# Patient Record
Sex: MALE | Race: WHITE | NOT HISPANIC OR LATINO | Employment: FULL TIME | URBAN - METROPOLITAN AREA
[De-identification: names, ages, dates, MRNs, and addresses within clinical notes are randomized per-mention and may not be internally consistent; named-entity substitution may affect disease eponyms.]

---

## 2021-01-18 ENCOUNTER — IMMUNIZATION (OUTPATIENT)
Dept: VACCINE CLINIC | Facility: HOSPITAL | Age: 36
End: 2021-01-18

## 2021-01-18 PROCEDURE — 0001A: CPT | Performed by: INTERNAL MEDICINE

## 2021-01-18 PROCEDURE — 91300 HC SARSCOV02 VAC 30MCG/0.3ML IM: CPT | Performed by: INTERNAL MEDICINE

## 2021-02-08 ENCOUNTER — IMMUNIZATION (OUTPATIENT)
Dept: VACCINE CLINIC | Facility: HOSPITAL | Age: 36
End: 2021-02-08

## 2021-02-08 PROCEDURE — 0002A: CPT | Performed by: INTERNAL MEDICINE

## 2021-02-08 PROCEDURE — 91300 HC SARSCOV02 VAC 30MCG/0.3ML IM: CPT | Performed by: INTERNAL MEDICINE

## 2024-04-24 ENCOUNTER — OFFICE VISIT (OUTPATIENT)
Dept: SLEEP MEDICINE | Age: 39
End: 2024-04-24
Payer: COMMERCIAL

## 2024-04-24 VITALS
DIASTOLIC BLOOD PRESSURE: 80 MMHG | TEMPERATURE: 98 F | OXYGEN SATURATION: 98 % | SYSTOLIC BLOOD PRESSURE: 134 MMHG | HEIGHT: 67 IN | HEART RATE: 80 BPM | WEIGHT: 225 LBS | BODY MASS INDEX: 35.31 KG/M2

## 2024-04-24 DIAGNOSIS — G47.33 OBSTRUCTIVE SLEEP APNEA, ADULT: ICD-10-CM

## 2024-04-24 DIAGNOSIS — G47.19 EXCESSIVE DAYTIME SLEEPINESS: ICD-10-CM

## 2024-04-24 DIAGNOSIS — R06.83 SNORING: ICD-10-CM

## 2024-04-24 DIAGNOSIS — E66.9 OBESITY (BMI 30-39.9): Primary | ICD-10-CM

## 2024-04-24 PROCEDURE — 99203 OFFICE O/P NEW LOW 30 MIN: CPT | Performed by: INTERNAL MEDICINE

## 2024-04-24 RX ORDER — ATORVASTATIN CALCIUM 40 MG/1
40 TABLET, FILM COATED ORAL DAILY
COMMUNITY

## 2024-04-24 NOTE — PROGRESS NOTES
"  Ulisses Potts is a 38 y.o. male.   Chief Complaint   Patient presents with    Snoring     SLEEP DISTURBANCE       HPI     38 y.o. male seen for the above    He describes longstanding snoring and nonrestorative sleep.  This started in a fairly minor way but over the last 3 or 4 years has progressed.  He is feeling more fatigued during the day.  His wife is complaining of snoring as well as apneas that are more prominent.    He wakes with a dry mouth and sore throat.  He has nocturnal reflux.  He has about 2 awakenings on average on a nightly basis.  He gets in bed around midnight and awakens between 7 and 9 AM.  He thinks he averages 6 or 7 hours of sleep per night.    He denies any RLS type symptoms.  He has not gained significant pastor of weight in the last 2 years.    [place] Scale is: 2/24    The patient's relevant past medical, surgical, family, and social history reviewed and updated in Epic as appropriate.    Current medications are:   Current Outpatient Medications:     atorvastatin (LIPITOR) 40 MG tablet, Take 1 tablet by mouth Daily., Disp: , Rfl: .    Review of Systems    Review of Systems  ROS documented in patient questionnaire ×14 systems.  Reviewed with patient.  Otherwise negative except as noted in HPI.    Physical Exam    Blood pressure 134/80, pulse 80, temperature 98 °F (36.7 °C), height 170.2 cm (67\"), weight 102 kg (225 lb), SpO2 98%. Body mass index is 35.24 kg/m².    Physical Exam  Vitals and nursing note reviewed.   Constitutional:       Appearance: Normal appearance. He is well-developed.   HENT:      Head: Normocephalic and atraumatic.      Nose: Nose normal.      Mouth/Throat:      Mouth: Mucous membranes are moist.      Pharynx: Oropharynx is clear. No oropharyngeal exudate.      Comments: Class airway IV   Eyes:      General: No scleral icterus.     Conjunctiva/sclera: Conjunctivae normal.   Neck:      Thyroid: No thyromegaly.      Trachea: No tracheal deviation.   Cardiovascular: "      Rate and Rhythm: Normal rate and regular rhythm.      Heart sounds: No murmur heard.     No friction rub. No gallop.   Pulmonary:      Effort: Pulmonary effort is normal. No respiratory distress.      Breath sounds: No wheezing or rales.   Musculoskeletal:         General: No deformity. Normal range of motion.   Skin:     General: Skin is warm and dry.      Findings: No rash.   Neurological:      Mental Status: He is alert and oriented to person, place, and time.   Psychiatric:         Behavior: Behavior normal.         Thought Content: Thought content normal.         DATA:    Reviewed bed partner questionnaire    ASSESSMENT:    Problem List Items Addressed This Visit       Obesity (BMI 30-39.9) - Primary     Other Visit Diagnoses       Obstructive sleep apnea, adult        Relevant Orders    Home Sleep Study    Snoring        Relevant Orders    Home Sleep Study    Excessive daytime sleepiness        Relevant Orders    Home Sleep Study            38-year-old male who presents to the sleep clinic with progressive snoring, witnessed apneas, and fatigue.  He has a class IV airway and elevated BMI.  He is at increased risk for the presence of sleep apnea and based upon his presentation I would recommend further evaluation for the likely presence of NOLBERTO.  I went over the likely diagnosis as well as the diagnostic process and treatment options in detail and answered all questions.  He agrees to proceed as noted below    PLAN:    - Home sleep apnea testing.  - Diagnostic process and potential treatment options discussed and questions/concerns addressed.  - Long-term cardiovascular and metabolic risks of untreated obstructive sleep apnea reviewed with the patient.  - The importance of long-term healthy weight loss discussed.  - Patient was agreeable to a trial of CPAP therapy on an initial trial basis if recommended after testing complete.  - Sleep center follow-up.    I have reviewed the results of my evaluation and  impression and discussed my recommendations in detail with the patient.    Signed by  Rahul Perez MD    April 24, 2024      CC: Maicol Hernandez MD Breeding, Van S, MD

## 2024-05-28 ENCOUNTER — HOSPITAL ENCOUNTER (OUTPATIENT)
Dept: SLEEP MEDICINE | Facility: HOSPITAL | Age: 39
Discharge: HOME | End: 2024-05-28
Admitting: INTERNAL MEDICINE
Payer: COMMERCIAL

## 2024-05-28 VITALS — WEIGHT: 225 LBS | BODY MASS INDEX: 35.31 KG/M2 | HEIGHT: 67 IN

## 2024-05-28 DIAGNOSIS — G47.19 EXCESSIVE DAYTIME SLEEPINESS: ICD-10-CM

## 2024-05-28 DIAGNOSIS — R06.83 SNORING: ICD-10-CM

## 2024-05-28 DIAGNOSIS — G47.33 OBSTRUCTIVE SLEEP APNEA, ADULT: ICD-10-CM

## 2024-05-28 PROCEDURE — 95800 SLP STDY UNATTENDED: CPT

## 2024-05-29 DIAGNOSIS — G47.33 OSA (OBSTRUCTIVE SLEEP APNEA): Primary | ICD-10-CM

## 2024-05-29 PROCEDURE — 95800 SLP STDY UNATTENDED: CPT | Performed by: INTERNAL MEDICINE

## 2024-05-31 ENCOUNTER — TELEPHONE (OUTPATIENT)
Dept: SLEEP MEDICINE | Age: 39
End: 2024-05-31
Payer: COMMERCIAL

## 2024-05-31 NOTE — TELEPHONE ENCOUNTER
Patient returned call he's ready to start CPAP therapy prefer DME in [facility]. Any questions call pt if needed at [phone]

## 2024-09-20 ENCOUNTER — OFFICE VISIT (OUTPATIENT)
Dept: SLEEP MEDICINE | Age: 39
End: 2024-09-20
Payer: COMMERCIAL

## 2024-09-20 VITALS
HEIGHT: 67 IN | DIASTOLIC BLOOD PRESSURE: 68 MMHG | TEMPERATURE: 97.3 F | WEIGHT: 235.5 LBS | HEART RATE: 86 BPM | BODY MASS INDEX: 36.96 KG/M2 | SYSTOLIC BLOOD PRESSURE: 122 MMHG | OXYGEN SATURATION: 95 %

## 2024-09-20 DIAGNOSIS — G47.33 OSA (OBSTRUCTIVE SLEEP APNEA): Primary | ICD-10-CM

## 2024-09-20 DIAGNOSIS — E66.01 MORBID (SEVERE) OBESITY DUE TO EXCESS CALORIES: ICD-10-CM

## 2025-07-13 ENCOUNTER — HOSPITAL ENCOUNTER (EMERGENCY)
Facility: HOSPITAL | Age: 40
Discharge: HOME | End: 2025-07-13
Attending: EMERGENCY MEDICINE | Admitting: EMERGENCY MEDICINE
Payer: COMMERCIAL

## 2025-07-13 VITALS
WEIGHT: 241.4 LBS | HEIGHT: 68 IN | DIASTOLIC BLOOD PRESSURE: 110 MMHG | TEMPERATURE: 97.8 F | OXYGEN SATURATION: 97 % | BODY MASS INDEX: 36.59 KG/M2 | SYSTOLIC BLOOD PRESSURE: 125 MMHG | RESPIRATION RATE: 20 BRPM | HEART RATE: 80 BPM

## 2025-07-13 DIAGNOSIS — K62.5 RECTAL BLEEDING: Primary | ICD-10-CM

## 2025-07-13 LAB
ALBUMIN SERPL-MCNC: 4.1 G/DL (ref 3.5–5.2)
ALBUMIN/GLOB SERPL: 1.4 G/DL
ALP SERPL-CCNC: 93 U/L (ref 39–117)
ALT SERPL W P-5'-P-CCNC: 105 U/L (ref 1–41)
ANION GAP SERPL CALCULATED.3IONS-SCNC: 12.4 MMOL/L (ref 5–15)
AST SERPL-CCNC: 43 U/L (ref 1–40)
BASOPHILS # BLD AUTO: 0.02 10*3/MM3 (ref 0–0.2)
BASOPHILS NFR BLD AUTO: 0.2 % (ref 0–1.5)
BILIRUB SERPL-MCNC: 0.8 MG/DL (ref 0–1.2)
BILIRUB UR QL STRIP: NEGATIVE
BUN SERPL-MCNC: 15.8 MG/DL (ref 6–20)
BUN/CREAT SERPL: 15.5 (ref 7–25)
CALCIUM SPEC-SCNC: 9.2 MG/DL (ref 8.6–10.5)
CHLORIDE SERPL-SCNC: 102 MMOL/L (ref 98–107)
CLARITY UR: CLEAR
CO2 SERPL-SCNC: 24.6 MMOL/L (ref 22–29)
COLOR UR: YELLOW
CREAT SERPL-MCNC: 1.02 MG/DL (ref 0.76–1.27)
DEPRECATED RDW RBC AUTO: 44.7 FL (ref 37–54)
DEVELOPER EXPIRATION DATE: ABNORMAL
DEVELOPER LOT NUMBER: ABNORMAL
EGFRCR SERPLBLD CKD-EPI 2021: 96.5 ML/MIN/1.73
EOSINOPHIL # BLD AUTO: 0.07 10*3/MM3 (ref 0–0.4)
EOSINOPHIL NFR BLD AUTO: 0.8 % (ref 0.3–6.2)
ERYTHROCYTE [DISTWIDTH] IN BLOOD BY AUTOMATED COUNT: 14.2 % (ref 12.3–15.4)
EXPIRATION DATE: ABNORMAL
FECAL OCCULT BLOOD SCREEN, POC: POSITIVE
GLOBULIN UR ELPH-MCNC: 3 GM/DL
GLUCOSE SERPL-MCNC: 103 MG/DL (ref 65–99)
GLUCOSE UR STRIP-MCNC: NEGATIVE MG/DL
HCT VFR BLD AUTO: 43.1 % (ref 37.5–51)
HGB BLD-MCNC: 14.1 G/DL (ref 13–17.7)
HGB UR QL STRIP.AUTO: NEGATIVE
HOLD SPECIMEN: NORMAL
IMM GRANULOCYTES # BLD AUTO: 0.01 10*3/MM3 (ref 0–0.05)
IMM GRANULOCYTES NFR BLD AUTO: 0.1 % (ref 0–0.5)
KETONES UR QL STRIP: NEGATIVE
LEUKOCYTE ESTERASE UR QL STRIP.AUTO: NEGATIVE
LYMPHOCYTES # BLD AUTO: 0.88 10*3/MM3 (ref 0.7–3.1)
LYMPHOCYTES NFR BLD AUTO: 9.7 % (ref 19.6–45.3)
Lab: ABNORMAL
MCH RBC QN AUTO: 27.7 PG (ref 26.6–33)
MCHC RBC AUTO-ENTMCNC: 32.7 G/DL (ref 31.5–35.7)
MCV RBC AUTO: 84.7 FL (ref 79–97)
MONOCYTES # BLD AUTO: 0.46 10*3/MM3 (ref 0.1–0.9)
MONOCYTES NFR BLD AUTO: 5.1 % (ref 5–12)
NEGATIVE CONTROL: NEGATIVE
NEUTROPHILS NFR BLD AUTO: 7.62 10*3/MM3 (ref 1.7–7)
NEUTROPHILS NFR BLD AUTO: 84.1 % (ref 42.7–76)
NITRITE UR QL STRIP: NEGATIVE
PH UR STRIP.AUTO: 6 [PH] (ref 5–8)
PLATELET # BLD AUTO: 168 10*3/MM3 (ref 140–450)
PMV BLD AUTO: 10.4 FL (ref 6–12)
POSITIVE CONTROL: POSITIVE
POTASSIUM SERPL-SCNC: 3.6 MMOL/L (ref 3.5–5.2)
PROT SERPL-MCNC: 7.1 G/DL (ref 6–8.5)
PROT UR QL STRIP: NEGATIVE
RBC # BLD AUTO: 5.09 10*6/MM3 (ref 4.14–5.8)
SODIUM SERPL-SCNC: 139 MMOL/L (ref 136–145)
SP GR UR STRIP: <=1.005 (ref 1–1.03)
UROBILINOGEN UR QL STRIP: NORMAL
WBC NRBC COR # BLD AUTO: 9.06 10*3/MM3 (ref 3.4–10.8)
WHOLE BLOOD HOLD COAG: NORMAL
WHOLE BLOOD HOLD SPECIMEN: NORMAL

## 2025-07-13 PROCEDURE — 82270 OCCULT BLOOD FECES: CPT | Performed by: EMERGENCY MEDICINE

## 2025-07-13 PROCEDURE — 80053 COMPREHEN METABOLIC PANEL: CPT | Performed by: EMERGENCY MEDICINE

## 2025-07-13 PROCEDURE — 85025 COMPLETE CBC W/AUTO DIFF WBC: CPT

## 2025-07-13 PROCEDURE — 81003 URINALYSIS AUTO W/O SCOPE: CPT

## 2025-07-13 PROCEDURE — 99284 EMERGENCY DEPT VISIT MOD MDM: CPT | Performed by: EMERGENCY MEDICINE

## 2025-07-13 RX ORDER — SODIUM CHLORIDE 0.9 % (FLUSH) 0.9 %
10 SYRINGE (ML) INJECTION AS NEEDED
Status: DISCONTINUED | OUTPATIENT
Start: 2025-07-13 | End: 2025-07-13 | Stop reason: HOSPADM

## 2025-07-13 NOTE — FSED PROVIDER NOTE
"Subjective  History of Present Illness:    Patient is a 39-year-old male presents with a couple days of bright red blood when he has a bowel movement, sometimes in the toilet after a bowel movement and when he wipes.  Patient states he had an episode last night of a tremor and shaking but that resolved on its own.  Patient states he has had hemorrhoids in the past and this feels similar.  Patient denies abdominal pain, nausea, vomiting, diarrhea, urinary complaints, black tarry stool      Nurses Notes reviewed and agree, including vitals, allergies, social history and prior medical history.     REVIEW OF SYSTEMS: All systems reviewed and not pertinent unless noted.  Review of Systems    Past Medical History:   Diagnosis Date    Hyperlipidemia        Allergies:    Patient has no known allergies.      Past Surgical History:   Procedure Laterality Date    WISDOM TOOTH EXTRACTION           Social History     Socioeconomic History    Marital status:    Tobacco Use    Smoking status: Never    Smokeless tobacco: Never   Vaping Use    Vaping status: Never Used   Substance and Sexual Activity    Alcohol use: Yes     Comment: OCC.    Sexual activity: Defer         Family History   Problem Relation Age of Onset    Heart disease Mother     Heart disease Maternal Grandmother        Objective  Physical Exam:  BP (!) 125/110   Pulse 80   Temp 97.8 °F (36.6 °C) (Oral)   Resp 20   Ht 172.7 cm (68\")   Wt 109 kg (241 lb 6.4 oz)   SpO2 97%   BMI 36.70 kg/m²      Physical Exam  Constitutional:       Appearance: Well-developed. No acute distress  HENT:      Head: Normocephalic and atraumatic.      Mouth/Throat:      Mouth: Mucous membranes are moist.      Eyes:      Extraocular Movements: Extraocular movements intact.   Cardiovascular:      Rate and Rhythm: Normal rate and regular rhythm.      Heart sounds: Normal heart sounds.   Pulmonary:      Effort: Pulmonary effort is normal. No respiratory distress.      Breath " sounds: Normal breath sounds.   Abdominal:      General: There is no distension.      Palpations: Abdomen is soft and nontender. No rebound tenderness or guarding noted  Rectal exam nurse chaperoned: Tmi blood noted upon rectal exam, small amount.  Significant pain with rectal exam.  No external hemorrhoids noted.  Musculoskeletal:         General: No swelling or tenderness.       Extremities: Moves all 4s   Skin:     General: Skin is warm and dry.      Capillary Refill: Capillary refill takes less than 2 seconds.   Neurological:      Mental Status:Alert and oriented to person, place, and time.   Mentation is normal   Psychiatric:         Mood and Affect: Mood normal.         Behavior: Behavior normal.     Procedures    ED Course:         Lab Results (last 24 hours)       Procedure Component Value Units Date/Time    CBC & Differential [[id number]]  (Abnormal) Collected: 07/13/25 1224    Specimen: Blood Updated: 07/13/25 1238    Narrative:      The following orders were created for panel order CBC & Differential.  Procedure                               Abnormality         Status                     ---------                               -----------         ------                     CBC Auto Differential[[id number]]        Abnormal            Final result                 Please view results for these tests on the individual orders.    Comprehensive Metabolic Panel [[id number]]  (Abnormal) Collected: 07/13/25 1224    Specimen: Blood Updated: 07/13/25 1253     Glucose 103 mg/dL      BUN 15.8 mg/dL      Creatinine 1.02 mg/dL      Sodium 139 mmol/L      Potassium 3.6 mmol/L      Chloride 102 mmol/L      CO2 24.6 mmol/L      Calcium 9.2 mg/dL      Total Protein 7.1 g/dL      Albumin 4.1 g/dL      ALT (SGPT) 105 U/L      AST (SGOT) 43 U/L      Alkaline Phosphatase 93 U/L      Total Bilirubin 0.8 mg/dL      Globulin 3.0 gm/dL      A/G Ratio 1.4 g/dL      BUN/Creatinine Ratio 15.5     Anion Gap 12.4 mmol/L      eGFR 96.5  mL/min/1.73     Narrative:      GFR Categories in Chronic Kidney Disease (CKD)              GFR Category          GFR (mL/min/1.73)    Interpretation  G1                    90 or greater        Normal or high (1)  G2                    60-89                Mild decrease (1)  G3a                   45-59                Mild to moderate decrease  G3b                   30-44                Moderate to severe decrease  G4                    15-29                Severe decrease  G5                    14 or less           Kidney failure    (1)In the absence of evidence of kidney disease, neither GFR category G1 or G2 fulfill the criteria for CKD.    eGFR calculation 2021 CKD-EPI creatinine equation, which does not include race as a factor    CBC Auto Differential [[id number]]  (Abnormal) Collected: 07/13/25 1224    Specimen: Blood Updated: 07/13/25 1238     WBC 9.06 10*3/mm3      RBC 5.09 10*6/mm3      Hemoglobin 14.1 g/dL      Hematocrit 43.1 %      MCV 84.7 fL      MCH 27.7 pg      MCHC 32.7 g/dL      RDW 14.2 %      RDW-SD 44.7 fl      MPV 10.4 fL      Platelets 168 10*3/mm3      Neutrophil % 84.1 %      Lymphocyte % 9.7 %      Monocyte % 5.1 %      Eosinophil % 0.8 %      Basophil % 0.2 %      Immature Grans % 0.1 %      Neutrophils, Absolute 7.62 10*3/mm3      Lymphocytes, Absolute 0.88 10*3/mm3      Monocytes, Absolute 0.46 10*3/mm3      Eosinophils, Absolute 0.07 10*3/mm3      Basophils, Absolute 0.02 10*3/mm3      Immature Grans, Absolute 0.01 10*3/mm3     POC Occult Blood Stool [[id number]]  (Abnormal) Collected: 07/13/25 1307    Specimen: Stool Updated: 07/13/25 1309     Fecal Occult Blood Positive     Lot Number [id number]     Expiration Date 11-26     DEVELOPER LOT NUMBER [id number]     DEVELOPER EXPIRATION DATE 8/27     Positive Control Positive     Negative Control Negative    Urinalysis With Culture If Indicated - Urine, Clean Catch [[id number]]  (Normal) Collected: 07/13/25 1322    Specimen: Urine, Clean Catch Updated:  07/13/25 1327     Color, UA Yellow     Appearance, UA Clear     pH, UA 6.0     Specific Gravity, UA <=1.005     Glucose, UA Negative     Ketones, UA Negative     Bilirubin, UA Negative     Blood, UA Negative     Protein, UA Negative     Leuk Esterase, UA Negative     Nitrite, UA Negative     Urobilinogen, UA 0.2 E.U./dL    Narrative:      In absence of clinical symptoms, the presence of pyuria, bacteria, and/or nitrites on the urinalysis result does not correlate with infection.  Urine microscopic not indicated.             No radiology results from the last 24 hrs       MDM      Initial impression of presenting illness: Bright red blood per rectum when wiping for the past couple days, did have a couple episodes where was also in the toilet.  Patient denies abdominal pain, nausea, vomiting    DDX: includes but is not limited to: Thrombosed hemorrhoid, external hemorrhoid, internal hemorrhoid, anal fissure, lower GI bleed,     Patient arrives comfortable, vital signs stable with vitals interpreted by myself.     Pertinent results: Hemoglobin, hematocrit, white blood cell count all within normal limits.  CMP shows mild liver enzyme elevation, discussed to follow-up with GI, patient states he does follow-up with GI, had a colonoscopy last year  Offered patient CT scan for further evaluation of possible GI bleed and he states he does not want that at this time, states he has had something like this similar in the past and he would like to follow-up with GI.  Discussed risks of missing a critical diagnosis and he verbalizes understanding.    Diagnostic information from other sources: Chart review    Interventions / Re-evaluation: Patient resting comfortably denying any pain or current complaints    Medications   sodium chloride 0.9 % flush 10 mL (has no administration in time range)       Results/clinical rationale were discussed with patient  Data interpreted: Nursing notes reviewed, vital signs reviewed.  Labs  independently interpreted by me     Counseling: Discussed the results above with the patient regarding need for admission or discharge.  Patient understands and agrees plan of care.  Discussed with patients the results from today's visit. Discussed with patient strict return precautions and they verbalize understanding. Recommend to them following up with primary care as soon as possible. Patient is discharged hemodynamically stable and comfortable.       -----  ED Disposition       ED Disposition   Discharge    Condition   Stable    Comment   --             Final diagnoses:   Rectal bleeding      Your Follow-Up Providers       Schedule an appointment as soon as possible for a visit  with Maicol Hernandez MD.    Specialty: Family Medicine  [address] [place] [address]  [phone]               [facility].    [address] [place] [id number] [phone]             [facility] .    Specialty: Gastroenterology  [address] [place] [id number] [phone]  Additional information:  Phone Number: [phone]   The practice is located in the [address] [place] [address]. There is  parking at the main entrance.                     Contact information for after-discharge care    Follow-up information has not been specified.                    Your medication list        CONTINUE taking these medications        Instructions Last Dose Given Next Dose Due   atorvastatin 40 MG tablet  Commonly known as: LIPITOR      Take 1 tablet by mouth Daily.